# Patient Record
Sex: MALE | Race: WHITE | NOT HISPANIC OR LATINO | URBAN - METROPOLITAN AREA
[De-identification: names, ages, dates, MRNs, and addresses within clinical notes are randomized per-mention and may not be internally consistent; named-entity substitution may affect disease eponyms.]

---

## 2020-07-28 ENCOUNTER — NURSE TRIAGE (OUTPATIENT)
Dept: TELEHEALTH | Age: 60
End: 2020-07-28

## 2020-07-28 DIAGNOSIS — Z20.822 SUSPECTED COVID-19 VIRUS INFECTION: Primary | ICD-10-CM

## 2020-07-31 ENCOUNTER — LAB SERVICES (OUTPATIENT)
Dept: LAB | Age: 60
End: 2020-07-31

## 2020-07-31 DIAGNOSIS — Z20.822 SUSPECTED COVID-19 VIRUS INFECTION: ICD-10-CM

## 2020-08-02 LAB — SARS-COV-2 N GENE RESP QL NAA+PROBE: NEGATIVE

## 2020-09-22 ENCOUNTER — APPOINTMENT (OUTPATIENT)
Dept: RADIOLOGY | Facility: HOSPITAL | Age: 60
End: 2020-09-22
Payer: COMMERCIAL

## 2020-09-22 ENCOUNTER — APPOINTMENT (OUTPATIENT)
Dept: CT IMAGING | Facility: HOSPITAL | Age: 60
End: 2020-09-22
Payer: COMMERCIAL

## 2020-09-22 ENCOUNTER — OFFICE VISIT (OUTPATIENT)
Dept: URGENT CARE | Facility: URGENT CARE | Age: 60
End: 2020-09-22
Payer: COMMERCIAL

## 2020-09-22 ENCOUNTER — HOSPITAL ENCOUNTER (EMERGENCY)
Facility: HOSPITAL | Age: 60
Discharge: HOME | End: 2020-09-22
Attending: EMERGENCY MEDICINE
Payer: COMMERCIAL

## 2020-09-22 VITALS
TEMPERATURE: 98.1 F | OXYGEN SATURATION: 94 % | HEART RATE: 84 BPM | DIASTOLIC BLOOD PRESSURE: 81 MMHG | HEIGHT: 72 IN | SYSTOLIC BLOOD PRESSURE: 148 MMHG | RESPIRATION RATE: 16 BRPM | BODY MASS INDEX: 32.51 KG/M2 | WEIGHT: 240 LBS

## 2020-09-22 VITALS
OXYGEN SATURATION: 92 % | WEIGHT: 246.03 LBS | SYSTOLIC BLOOD PRESSURE: 128 MMHG | TEMPERATURE: 99.7 F | BODY MASS INDEX: 33.32 KG/M2 | HEART RATE: 67 BPM | HEIGHT: 72 IN | RESPIRATION RATE: 16 BRPM | DIASTOLIC BLOOD PRESSURE: 80 MMHG

## 2020-09-22 DIAGNOSIS — E87.6 HYPOKALEMIA: ICD-10-CM

## 2020-09-22 DIAGNOSIS — R20.2 PARESTHESIAS: ICD-10-CM

## 2020-09-22 DIAGNOSIS — R07.9 CHEST PAIN, UNSPECIFIED TYPE: ICD-10-CM

## 2020-09-22 DIAGNOSIS — R09.89 SYMPTOMS OF CEREBROVASCULAR ACCIDENT (CVA): Primary | ICD-10-CM

## 2020-09-22 DIAGNOSIS — E83.51 HYPOCALCEMIA: Primary | ICD-10-CM

## 2020-09-22 LAB
ALBUMIN SERPL-MCNC: 3.8 G/DL (ref 3.4–5)
ALBUMIN SERPL-MCNC: 4.1 G/DL (ref 3.5–5.3)
ALP SERPL-CCNC: 76 U/L (ref 45–115)
ALP SERPL-CCNC: 77 U/L (ref 45–115)
ALT SERPL-CCNC: 40 U/L (ref 7–52)
ALT SERPL-CCNC: 47 U/L (ref 0–77)
ANION GAP SERPL CALC-SCNC: 12 MMOL/L (ref 3–11)
ANION GAP SERPL CALC-SCNC: 8 MMOL/L (ref 3–11)
APTT PPP: 27.3 SECONDS (ref 25.1–36.5)
AST SERPL-CCNC: 39 U/L
AST SERPL-CCNC: 46 U/L (ref 0–37)
BASOPHILS # BLD AUTO: 0.07 10*3/UL
BASOPHILS # BLD AUTO: 0.1 10*3/UL
BASOPHILS NFR BLD AUTO: 1 % (ref 0–2)
BASOPHILS NFR BLD AUTO: 1 % (ref 0–2)
BILIRUB SERPL-MCNC: 0.5 MG/DL (ref 0.2–1.4)
BILIRUB SERPL-MCNC: 0.7 MG/DL (ref 0–1.4)
BUN SERPL-MCNC: 12 MG/DL (ref 7–25)
BUN SERPL-MCNC: 13 MG/DL (ref 7–25)
CALCIUM ALBUM COR SERPL-MCNC: 7.7 MG/DL (ref 8.6–10.3)
CALCIUM ALBUM COR SERPL-MCNC: 8 MG/DL (ref 8.6–10.1)
CALCIUM SERPL-MCNC: 7.8 MG/DL (ref 8.6–10.1)
CALCIUM SERPL-MCNC: 7.8 MG/DL (ref 8.6–10.3)
CHLORIDE SERPL-SCNC: 102 MMOL/L (ref 98–107)
CHLORIDE SERPL-SCNC: 103 MMOL/L (ref 98–107)
CO2 SERPL-SCNC: 28 MMOL/L (ref 21–32)
CO2 SERPL-SCNC: 28 MMOL/L (ref 21–32)
CREAT SERPL-MCNC: 1.11 MG/DL (ref 0.7–1.3)
CREAT SERPL-MCNC: 1.4 MG/DL (ref 0.8–1.3)
EOSINOPHIL # BLD AUTO: 0.2 10*3/UL
EOSINOPHIL # BLD AUTO: 0.27 10*3/UL
EOSINOPHIL NFR BLD AUTO: 3 % (ref 0–3)
EOSINOPHIL NFR BLD AUTO: 4 % (ref 0–3)
ERYTHROCYTE [DISTWIDTH] IN BLOOD BY AUTOMATED COUNT: 15.3 % (ref 11.5–15)
ERYTHROCYTE [DISTWIDTH] IN BLOOD BY AUTOMATED COUNT: 15.8 % (ref 11.5–15)
GFR SERPL CREATININE-BSD FRML MDRD: 54 ML/MIN/1.73M*2
GFR SERPL CREATININE-BSD FRML MDRD: 72 ML/MIN/1.73M*2
GLUCOSE BLDC GLUCOMTR-MCNC: 80 MG/DL (ref 70–105)
GLUCOSE SERPL-MCNC: 80 MG/DL (ref 70–105)
GLUCOSE SERPL-MCNC: 86 MG/DL (ref 70–105)
HCT VFR BLD AUTO: 37.8 % (ref 38–50)
HCT VFR BLD AUTO: 38.1 % (ref 38–50)
HGB BLD-MCNC: 12.4 G/DL (ref 13.2–17.2)
HGB BLD-MCNC: 12.9 G/DL (ref 13.2–17.2)
HYPOCHROMIA PRESENCE IN BLOOD, ANALYZER: ABNORMAL
INR BLD: 1.1
LYMPHOCYTES # BLD AUTO: 1.8 10*3/UL
LYMPHOCYTES # BLD AUTO: 2.24 10*3/UL
LYMPHOCYTES NFR BLD AUTO: 25 % (ref 15–47)
LYMPHOCYTES NFR BLD AUTO: 31 % (ref 15–47)
MCH RBC QN AUTO: 25.5 PG (ref 29–34)
MCH RBC QN AUTO: 27.1 PG (ref 29–34)
MCHC RBC AUTO-ENTMCNC: 32.7 G/DL (ref 32–36)
MCHC RBC AUTO-ENTMCNC: 34.2 G/DL (ref 32–36)
MCV RBC AUTO: 78 FL (ref 82–97)
MCV RBC AUTO: 79.2 FL (ref 82–97)
MICROCYTOSIS PRESENCE IN BLOOD, ANALYZER: ABNORMAL
MONOCYTES # BLD AUTO: 0.9 10*3/UL
MONOCYTES # BLD AUTO: 0.96 10*3/UL
MONOCYTES NFR BLD AUTO: 13 % (ref 5–13)
MONOCYTES NFR BLD AUTO: 13 % (ref 5–13)
NEUTROPHILS # BLD AUTO: 3.78 10*3/UL
NEUTROPHILS # BLD AUTO: 4 10*3/UL
NEUTROPHILS NFR BLD AUTO: 52 % (ref 46–70)
NEUTROPHILS NFR BLD AUTO: 58 % (ref 46–70)
PLATELET # BLD AUTO: 284 10*3/UL (ref 130–350)
PLATELET # BLD AUTO: 289 10*3/UL (ref 130–350)
PMV BLD AUTO: 8.3 FL (ref 6.9–10.8)
PMV BLD AUTO: 8.6 FL (ref 6.9–10.8)
POTASSIUM SERPL-SCNC: 2.9 MMOL/L (ref 3.5–5.1)
POTASSIUM SERPL-SCNC: 3 MMOL/L (ref 3.6–5)
PROT SERPL-MCNC: 6.7 G/DL (ref 6.4–8.2)
PROT SERPL-MCNC: 6.8 G/DL (ref 6–8.3)
PROTHROMBIN TIME: 12.8 SECONDS (ref 9.4–12.5)
RBC # BLD AUTO: 4.77 10*6/ΜL (ref 4.1–5.8)
RBC # BLD AUTO: 4.88 10*6/ΜL (ref 4.1–5.8)
SODIUM SERPL-SCNC: 139 MMOL/L (ref 135–145)
SODIUM SERPL-SCNC: 142 MMOL/L (ref 135–145)
TROPONIN I SERPL-MCNC: 7.4 PG/ML
WBC # BLD AUTO: 7 10*3/UL (ref 3.7–9.6)
WBC # BLD AUTO: 7.3 10*3/UL (ref 3.7–9.6)

## 2020-09-22 PROCEDURE — 99285 EMERGENCY DEPT VISIT HI MDM: CPT | Performed by: EMERGENCY MEDICINE

## 2020-09-22 PROCEDURE — 93005 ELECTROCARDIOGRAM TRACING: CPT | Performed by: EMERGENCY MEDICINE

## 2020-09-22 PROCEDURE — G1004 CDSM NDSC: HCPCS

## 2020-09-22 PROCEDURE — 2550000100 HC RX 255: Performed by: EMERGENCY MEDICINE

## 2020-09-22 PROCEDURE — 99203 OFFICE O/P NEW LOW 30 MIN: CPT | Mod: 25 | Performed by: NURSE PRACTITIONER

## 2020-09-22 PROCEDURE — 6360000200 HC RX 636 W HCPCS (ALT 250 FOR IP): Performed by: EMERGENCY MEDICINE

## 2020-09-22 PROCEDURE — 36415 COLL VENOUS BLD VENIPUNCTURE: CPT | Performed by: NURSE PRACTITIONER

## 2020-09-22 PROCEDURE — 93000 ELECTROCARDIOGRAM COMPLETE: CPT | Performed by: FAMILY MEDICINE

## 2020-09-22 PROCEDURE — 6370000100 HC RX 637 (ALT 250 FOR IP): Performed by: EMERGENCY MEDICINE

## 2020-09-22 PROCEDURE — 96366 THER/PROPH/DIAG IV INF ADDON: CPT

## 2020-09-22 PROCEDURE — 80053 COMPREHEN METABOLIC PANEL: CPT | Performed by: NURSE PRACTITIONER

## 2020-09-22 PROCEDURE — 85025 COMPLETE CBC W/AUTO DIFF WBC: CPT | Performed by: EMERGENCY MEDICINE

## 2020-09-22 PROCEDURE — 85610 PROTHROMBIN TIME: CPT | Performed by: EMERGENCY MEDICINE

## 2020-09-22 PROCEDURE — 85025 COMPLETE CBC W/AUTO DIFF WBC: CPT | Performed by: NURSE PRACTITIONER

## 2020-09-22 PROCEDURE — 96365 THER/PROPH/DIAG IV INF INIT: CPT

## 2020-09-22 PROCEDURE — 80053 COMPREHEN METABOLIC PANEL: CPT | Performed by: EMERGENCY MEDICINE

## 2020-09-22 PROCEDURE — 85730 THROMBOPLASTIN TIME PARTIAL: CPT | Performed by: EMERGENCY MEDICINE

## 2020-09-22 PROCEDURE — 82947 ASSAY GLUCOSE BLOOD QUANT: CPT | Mod: QW

## 2020-09-22 PROCEDURE — 84484 ASSAY OF TROPONIN QUANT: CPT | Performed by: EMERGENCY MEDICINE

## 2020-09-22 PROCEDURE — 71045 X-RAY EXAM CHEST 1 VIEW: CPT

## 2020-09-22 PROCEDURE — 2580000300 HC RX 258: Performed by: EMERGENCY MEDICINE

## 2020-09-22 RX ORDER — POTASSIUM CHLORIDE 750 MG/1
40 TABLET, FILM COATED, EXTENDED RELEASE ORAL ONCE
Status: COMPLETED | OUTPATIENT
Start: 2020-09-22 | End: 2020-09-22

## 2020-09-22 RX ORDER — CITALOPRAM 20 MG/1
20 TABLET, FILM COATED ORAL DAILY
COMMUNITY
Start: 2020-06-29

## 2020-09-22 RX ORDER — LEVOTHYROXINE SODIUM 75 UG/1
75 TABLET ORAL DAILY
COMMUNITY
Start: 2020-06-29

## 2020-09-22 RX ORDER — CETIRIZINE HYDROCHLORIDE 10 MG/1
10 TABLET ORAL DAILY
COMMUNITY

## 2020-09-22 RX ORDER — PANTOPRAZOLE SODIUM 40 MG/1
40 TABLET, DELAYED RELEASE ORAL 2 TIMES DAILY
COMMUNITY
Start: 2020-06-02

## 2020-09-22 RX ORDER — ALBUTEROL SULFATE 90 UG/1
2 INHALANT RESPIRATORY (INHALATION) EVERY 6 HOURS PRN
COMMUNITY

## 2020-09-22 RX ORDER — IOPAMIDOL 755 MG/ML
80 INJECTION, SOLUTION INTRAVASCULAR ONCE
Status: COMPLETED | OUTPATIENT
Start: 2020-09-22 | End: 2020-09-22

## 2020-09-22 RX ORDER — GABAPENTIN 300 MG/1
300 CAPSULE ORAL 2 TIMES DAILY PRN
COMMUNITY
Start: 2020-08-18

## 2020-09-22 RX ORDER — ATORVASTATIN CALCIUM 80 MG/1
80 TABLET, FILM COATED ORAL DAILY
COMMUNITY
Start: 2020-06-02

## 2020-09-22 RX ADMIN — CALCIUM GLUCONATE 1 G: 98 INJECTION, SOLUTION INTRAVENOUS at 13:39

## 2020-09-22 RX ADMIN — IOPAMIDOL 80 ML: 755 INJECTION, SOLUTION INTRAVENOUS at 12:41

## 2020-09-22 RX ADMIN — POTASSIUM CHLORIDE 40 MEQ: 750 TABLET, FILM COATED, EXTENDED RELEASE ORAL at 13:25

## 2020-09-22 ASSESSMENT — ENCOUNTER SYMPTOMS
WHEEZING: 0
BLOOD IN STOOL: 0
SHORTNESS OF BREATH: 0
FEVER: 0
STRIDOR: 0
SORE THROAT: 0
ARTHRALGIAS: 0
SINUS PRESSURE: 0
MYALGIAS: 0
EYE ITCHING: 0
FACIAL ASYMMETRY: 0
HEMATURIA: 0
HEADACHES: 0
DIARRHEA: 0
COUGH: 0
SINUS PAIN: 0
SPEECH DIFFICULTY: 1
EYE PAIN: 0
FLANK PAIN: 0
ABDOMINAL PAIN: 0
PALPITATIONS: 0
VOMITING: 0
FREQUENCY: 0
EYE DISCHARGE: 0
CHILLS: 0
DIZZINESS: 0
CONSTIPATION: 0
EYE REDNESS: 0
LIGHT-HEADEDNESS: 0
RHINORRHEA: 0
NAUSEA: 0
DYSURIA: 0

## 2020-09-22 ASSESSMENT — PAIN SCALES - GENERAL: PAINLEVEL: 0-NO PAIN

## 2020-09-22 NOTE — DISCHARGE INSTRUCTIONS
Continue all medications.  Follow-up with your primary physician when you return home.  Return if you have worsening or any new concerning symptoms.

## 2020-09-22 NOTE — MEDICATION HISTORY SPECIALIST NOTES
[facility] ED-214    CSN: [id number]  : [id number]    Information sources:  EPIC  Complete Dispense Report    Allergies verified.    Patient interviewed via phone who provided all history. Patient verified medications and provided last doses. Verified with Complete Dispense Report.    New medications added:  Albuterol Inhaler  Zyrtec

## 2020-09-22 NOTE — ED PROVIDER NOTES
Chief Complaint   Patient presents with   • Numbness     PT ARRIVES VIA RCFD WITH REPORTS OF TINGLING IN FACES, CHEST AND ARMS THAT STARTED AROUND 7:30 THIS MORNING. PT REPORTS CURRENTLY FEELING IT IN THE FACE.            HPI:    Patient is a 60 y.o. male who presents with tingling.  He states that this morning around 0730 when he woke up he had tingling across his chest into his arms.  This is now extended into the right side of his face and into his back.  No aggravating or alleviating factors.  He denies lateralizing weakness.  He was seen at urgent care and sent to the emergency department for evaluation.  He denies visual changes or headache.        Past Medical History:   Diagnosis Date   • Hypercholesteremia        History reviewed. No pertinent surgical history.    Social History     Socioeconomic History   • Marital status:      Spouse name: Not on file   • Number of children: Not on file   • Years of education: Not on file   • Highest education level: Not on file   Occupational History   • Not on file   Social Needs   • Financial resource strain: Not on file   • Food insecurity     Worry: Not on file     Inability: Not on file   • Transportation needs     Medical: Not on file     Non-medical: Not on file   Tobacco Use   • Smoking status: Never Smoker   • Smokeless tobacco: Never Used   Substance and Sexual Activity   • Alcohol use: Not on file   • Drug use: Not on file   • Sexual activity: Not on file   Lifestyle   • Physical activity     Days per week: Not on file     Minutes per session: Not on file   • Stress: Not on file   Relationships   • Social connections     Talks on phone: Not on file     Gets together: Not on file     Attends Anabaptism service: Not on file     Active member of club or organization: Not on file     Attends meetings of clubs or organizations: Not on file     Relationship status: Not on file   • Intimate partner violence     Fear of current or ex partner: Not on file      Emotionally abused: Not on file     Physically abused: Not on file     Forced sexual activity: Not on file   Other Topics Concern   • Not on file   Social History Narrative   • Not on file       History reviewed. No pertinent family history.    Allergies   Allergen Reactions   • Trazodone      Other reaction(s): GI Upset/Nausea/Vomiting         Current Outpatient Medications:   •  atorvastatin (LIPITOR) 80 mg tablet, Take 80 mg by mouth daily, Disp: , Rfl:   •  citalopram (CeleXA) 20 mg tablet, Take 20 mg by mouth daily  , Disp: , Rfl:   •  gabapentin (NEURONTIN) 300 mg capsule, Take 300 mg by mouth 2 times daily as needed, Disp: , Rfl:   •  levothyroxine (SYNTHROID, LEVOTHROID) 75 mcg tablet, Take 75 mcg by mouth daily  , Disp: , Rfl:   •  pantoprazole (PROTONIX) 40 mg EC tablet, Take 40 mg by mouth 2 times daily, Disp: , Rfl:   •  albuterol HFA (Ventolin HFA) 90 mcg/actuation inhaler, Inhale 2 puffs every 6 (six) hours as needed for wheezing, Disp: , Rfl:   •  cetirizine (ZyrTEC) 10 mg tablet, Take 10 mg by mouth daily, Disp: , Rfl:       ROS:  Constitutional: negative for fever  Eyes: negative for eye pain  ENT: negative for sore throat  Cardiovascular: negative for chest pain  Respiratory: negative for hemoptysis  GI: negative for abdominal pain  : negative for hematuria  Musculoskeletal: negative for back pain  Neuro: negative for headache  Hematology: negative for bleeding  Immunology: negative for joint pain      ED Triage Vitals   Temp Heart Rate Resp BP SpO2   09/22/20 1140 09/22/20 1140 09/22/20 1140 09/22/20 1140 09/22/20 1140   37.6 °C (99.7 °F) 80 15 132/82 94 %      Temp Source Heart Rate Source Patient Position BP Location FiO2 (%)   09/22/20 1140 -- 09/22/20 1200 -- --   Oral  Head of bed 30 degrees or higher           Physical Exam:  Nursing note and vitals reviewed.  Constitutional: appears well-developed.   Head: Normocephalic and atraumatic.   Eyes: Pupils are equal, round, and reactive to  light.   Neck: Supple  Cardiovascular: Regular rate and rhythm.  Normal pulses.  Pulmonary/Chest: No respiratory distress.    Abdominal: Soft and nontender.    Back: No CVA tenderness.  Musculoskeletal: No edema  Neurological: Alert and oriented ×3.  Decreased sensation in the right side of the face.  Otherwise cranial nerves II through XII intact.  5/5 bilateral upper and lower extremity strength.  Extremity sensation intact.  Speech normal.  Skin: Skin is warm and dry. No rash noted.   Psychiatric: Normal mood and affect.           Labs:  Labs Reviewed   CBC WITH AUTO DIFFERENTIAL - Abnormal       Result Value    WBC 7.0      RBC 4.88      Hemoglobin 12.4 (*)     Hematocrit 38.1      MCV 78.0 (*)     MCH 25.5 (*)     MCHC 32.7      RDW 15.8 (*)     Platelets 289      MPV 8.3      Neutrophils% 58      Lymphocytes% 25      Monocytes% 13      Eosinophils% 3      Basophils% 1      Neutrophils Absolute 4.00      Lymphocytes Absolute 1.80      Monocytes Absolute 0.90      Eosinophils Absolute 0.20      Basophils Absolute 0.10      Microcytosis 1+      Hypochromia 1+     COMPREHENSIVE METABOLIC PANEL - Abnormal    Sodium 142      Potassium 2.9 (*)     Chloride 102      CO2 28      Anion Gap 12 (*)     BUN 13      Creatinine 1.11      Glucose 80      Calcium 7.8 (*)     AST 39      ALT (SGPT) 40      Alkaline Phosphatase 76      Total Protein 6.8      Albumin 4.1      Total Bilirubin 0.50      eGFR 72      Corrected Calcium 7.7 (*)     Narrative:     Estimated GFR calculated using the 2009 CKD-EPI creatinine equation.   PROTIME-INR - Abnormal    Protime 12.8 (*)     INR 1.1     HIGH SENSITIVE TROPONIN I - Normal    hsTnI 0 Hour 7.4     PTT (ACTIVATED PARTIAL THROMBOPLASTIN TIME) - Normal    PTT 27.3     POCT GLUCOSE RESULTS ONLY - Normal    POC Glucose 80           Imaging:  CT angiogram head and carotid neck   Final Result   IMPRESSION:   1.  Negative noncontrast CT of the head.   2.  There is no stenosis of the right  carotid artery.   3.  There is no stenosis of the left carotid artery.   4.  There are no vertebral arterial occlusive changes.   5.  Normal intracranial circulation.      Internal Carotid artery measurements are obtained as follows:      US- Based on the Consensus Panel Gray-Scale and Doppler US Criteria for Diagnosis of ICA Stenosis.  Radiology 2003; 229: 340-346.      Angiography, Computed Tomography Angiography, and Magnetic Resonance Angiography- Based on the North American Symptomatic Carotid Endarterectomy Trial (NASCET). Radiographics 2005;25:4988-7831.            XR chest portable 1 view   Final Result   IMPRESSION:   1.  No acute disease.                MDM:    He is hemodynamically stable.  He does not meet stroke alert criteria given that the paresthesias are diffuse and the only lateralizing aspect is in the right side of the face.  CTA was obtained and shows no evidence of acute ischemia or arterial occlusion.  Labs are remarkable for hypo-kalemia and hypocalcemia.  His potassium and calcium was supplemented.  Symptoms are resolved in the emergency department.  Suspect that his paresthesias were related to hypocalcemia.  Results were discussed with the patient and family.  They are comfortable with discharge and outpatient follow-up.  Return instructions were discussed.        Given   ED Medication Administration from 09/22/2020 1136 to 09/22/2020 1631       Date/Time Order Dose Route Action Action by     09/22/2020 1241 iopamidoL (ISOVUE-370) 76 % injection 80 mL 80 mL intravenous Given SKY Trujillo     09/22/2020 1339 calcium gluconate 1 g in normal saline 250 mL IVPB - PERIPHERAL LINE 1 g intravenous New Bag/New Syringe DANNY Kolb     09/22/2020 1539 calcium gluconate 1 g in normal saline 250 mL IVPB - PERIPHERAL LINE 0 g intravenous Stopped DANNY Kolb     09/22/2020 1325 potassium chloride (KLOR-CON) CR tablet 40 mEq 40 mEq oral Given DANNY Kolb              Procedures  EKG interpretation: Sinus rhythm,  rate 73, no acute ST or T wave changes      Clinical Impression:    Hypocalcemia  Hypokalemia  Paresthesias      A voice recognition program was used to aid in documentation of this record.  Sometimes words are not printed exactly as they were spoken.  While efforts were made to carefully edit and correct any inaccuracies, some areas may be present; please take these into context.  Please contact the provider if areas are identified.     Je Mayen MD  09/22/20 7077

## 2020-09-22 NOTE — PROGRESS NOTES
Subjective      HPI    Osvaldo De La Rosa is a 60 y.o. male accompanied by his wife who presents for evaluation of strokelike symptoms.  Patient and his wife are out here in their RV, camping down at the [facility] campsite down in [place], [place].  History is obtained from both the wife and the patient.  Patient states that he awoke at 730 this morning with numbness/tingling sensation going down both arms, across his chest and upper back and radiating up into his neck and jawline.  According to the wife, she noticed some shaking/quivering of his lower lip.  She did not notice a facial droop.  States that he had difficulty word finding this morning.  They then drove to the urgent care and arrived at 10:45 AM.  Upon arrival, patient reporting blurred vision to the right eye, decreased sensation to the right side of his face, still having difficulty with word finding, no slurred speech.  No weakness of extremities on one side versus the other.  Denies headache.  He has never had these symptoms before.  No prior history of Bell's palsy.  No history of DVT/PE.  No history of cardiac arrhythmias.    Patient denies any prior history of CVA/TIA.  Denies any prior cardiac history.  Does take medicine for hypercholesterolemia.  Mild depression takes Celexa.  He is on gabapentin for a herniated disc in his neck.  Hypothyroidism on Synthroid.  GERD and is on Protonix.      Review of Systems   Constitutional: Negative for chills and fever.   HENT: Negative for ear discharge, ear pain, postnasal drip, rhinorrhea, sinus pressure, sinus pain and sore throat.    Eyes: Negative for pain, discharge, redness and itching.   Respiratory: Negative for cough, shortness of breath, wheezing and stridor.    Cardiovascular: Negative for chest pain, palpitations and leg swelling.   Gastrointestinal: Negative for abdominal pain, blood in stool, constipation, diarrhea, nausea and vomiting.   Genitourinary: Negative for dysuria, flank pain,  frequency, genital sores, hematuria and urgency.   Musculoskeletal: Negative for arthralgias and myalgias.   Skin: Negative for rash.   Neurological: Positive for speech difficulty. Negative for dizziness, syncope, facial asymmetry, light-headedness and headaches.        Decreased sensation/numbness to the right side of the face, blurred vision to the right eye -refer to HPI       As noted in HPI.      Objective   /81   Pulse 84   Temp 36.7 °C (98.1 °F) (Temporal)   Resp 16   Ht 1.829 m (6')   Wt 108.9 kg (240 lb)   SpO2 94%   BMI 32.55 kg/m²     Physical Exam  Vitals signs and nursing note reviewed.   Constitutional:       General: He is not in acute distress.     Appearance: Normal appearance. He is normal weight. He is not ill-appearing or toxic-appearing.   HENT:      Head: Normocephalic and atraumatic.      Right Ear: Tympanic membrane, ear canal and external ear normal.      Left Ear: Tympanic membrane, ear canal and external ear normal.      Nose: Nose normal. No congestion or rhinorrhea.      Mouth/Throat:      Mouth: Mucous membranes are moist.      Pharynx: Oropharynx is clear. No oropharyngeal exudate or posterior oropharyngeal erythema.   Eyes:      General:         Right eye: No discharge.         Left eye: No discharge.      Extraocular Movements: Extraocular movements intact.      Conjunctiva/sclera: Conjunctivae normal.      Pupils: Pupils are equal, round, and reactive to light.      Comments: Pupils 4+ reactive to 2+, brisk, symmetrical.  Patient reports increased blurred vision to the right eye -denies central or peripheral vision loss.   Neck:      Musculoskeletal: Normal range of motion and neck supple. No muscular tenderness.   Cardiovascular:      Rate and Rhythm: Normal rate and regular rhythm.      Pulses: Normal pulses.      Heart sounds: Normal heart sounds. No murmur. No friction rub. No gallop.    Pulmonary:      Effort: Pulmonary effort is normal. No respiratory distress.       Breath sounds: Normal breath sounds. No stridor. No wheezing, rhonchi or rales.   Abdominal:      General: Bowel sounds are normal. There is no distension.      Palpations: Abdomen is soft. There is no mass.      Tenderness: There is no abdominal tenderness. There is no right CVA tenderness, left CVA tenderness, guarding or rebound.      Hernia: No hernia is present.      Comments: Mildly obese.   Lymphadenopathy:      Cervical: No cervical adenopathy.   Skin:     General: Skin is warm and dry.      Capillary Refill: Capillary refill takes less than 2 seconds.      Findings: No rash.   Neurological:      General: No focal deficit present.      Mental Status: He is alert and oriented to person, place, and time.      Cranial Nerves: No cranial nerve deficit.      Comments: Decreased sensation noted to the right side of the face otherwise cranial nerves II through XII are intact.  She is able to raise eyebrows symmetrically, able to puff cheeks out bilaterally and symmetrical, tongue midline, no facial droop noted.  Shoulder shrug 5/5.  Bilateral upper extremity strength 5/5, bilateral lower extremity strength 5/5.  Speech is clear, however, difficulty with word finding at times.  Rapid alternating hand movements intact.  Negative pronator drift.  Gait steady.   Psychiatric:         Mood and Affect: Mood normal.         Behavior: Behavior normal.         Recent Results (from the past 4 hour(s))   CBC w/auto differential Blood, Venous    Collection Time: 09/22/20 11:07 AM   Result Value Ref Range    WBC 7.3 3.7 - 9.6 10*3/uL    RBC 4.77 4.10 - 5.80 10*6/µL    Hemoglobin 12.9 (L) 13.2 - 17.2 g/dL    Hematocrit 37.8 (L) 38.0 - 50.0 %    MCV 79.2 (L) 82.0 - 97.0 fL    MCH 27.1 (L) 29.0 - 34.0 pg    MCHC 34.2 32.0 - 36.0 g/dL    RDW 15.3 (H) 11.5 - 15.0 %    Platelets 284 130 - 350 10*3/uL    MPV 8.6 6.9 - 10.8 fL    Neutrophils% 52 46 - 70 %    Lymphocytes% 31 15 - 47 %    Monocytes% 13 5 - 13 %    Eosinophils% 4 (H)  0 - 3 %    Basophils% 1 0 - 2 %    Neutrophils Absolute 3.78 10*3/uL    Lymphocytes Absolute 2.24 10*3/uL    Monocytes Absolute 0.96 10*3/uL    Eosinophils Absolute 0.27 10*3/uL    Basophils Absolute 0.07 10*3/uL   Comprehensive metabolic panel Blood, Venous    Collection Time: 09/22/20 11:07 AM   Result Value Ref Range    Sodium 139 135 - 145 mmol/L    Potassium 3.0 (L) 3.6 - 5.0 mmol/L    Chloride 103 98 - 107 mmol/L    CO2 28 21 - 32 mmol/L    Anion Gap 8 3 - 11 mmol/L    BUN 12 7 - 25 mg/dL    Creatinine 1.40 (H) 0.80 - 1.30 mg/dL    Glucose 86 70 - 105 mg/dL    Calcium 7.8 (L) 8.6 - 10.1 mg/dL    AST 46 (H) 0 - 37 U/L    ALT (SGPT) 47 0 - 77 U/L    Alkaline Phosphatase 77 45 - 115 U/L    Total Protein 6.7 6.4 - 8.2 g/dL    Albumin 3.8 3.4 - 5.0 g/dL    Total Bilirubin 0.70 0.00 - 1.40 mg/dL    eGFR 54 (L) >60 mL/min/1.73m*2    Corrected Calcium 8.0 (L) 8.6 - 10.1 mg/dL         Preliminary twelve-lead EKG: Normal sinus rhythm without ectopy, no acute ST/T wave changes noted.  (No prior EKG for review).        Assessment/Plan   Diagnoses and all orders for this visit:    Symptoms of cerebrovascular accident (CVA)    Chest pain, unspecified type  -     ECG 12 lead  -     CBC w/auto differential Blood, Venous  -     Comprehensive metabolic panel Blood, Venous        Discussion:   Reviewed exam findings with patient and his wife.  Twelve-lead EKG, CBC, CMP are obtained.  Exam is completed within 5 minutes after arrival and based off exam findings and history, concern for symptoms of CVA/TIA.(onset of symptoms at 0730 with arrival to Urgent Care at 1045)  EMS summoned for transport to the emergency department.  Patient remained hemodynamically stable and unchanged neurologic exam during his urgent care stay.  Transfer call center is notified, report given.  Both patient and his wife verbalized understanding and agree with transfer to the emergency department via ambulance. Labs drawn prior to transport - follow up on  results at ED.    Case is reviewed with Dr. Camarillo who agrees with transfer to the emergency department for higher level of care/evaluation of CVA symptoms via ambulance transport.       Kaylee Johnson, CNP